# Patient Record
Sex: FEMALE | Race: AMERICAN INDIAN OR ALASKA NATIVE
[De-identification: names, ages, dates, MRNs, and addresses within clinical notes are randomized per-mention and may not be internally consistent; named-entity substitution may affect disease eponyms.]

---

## 2019-10-21 ENCOUNTER — HOSPITAL ENCOUNTER (EMERGENCY)
Dept: HOSPITAL 5 - ED | Age: 74
Discharge: HOME | End: 2019-10-21
Payer: MEDICARE

## 2019-10-21 VITALS — SYSTOLIC BLOOD PRESSURE: 141 MMHG | DIASTOLIC BLOOD PRESSURE: 82 MMHG

## 2019-10-21 DIAGNOSIS — M19.90: ICD-10-CM

## 2019-10-21 DIAGNOSIS — Z98.890: ICD-10-CM

## 2019-10-21 DIAGNOSIS — I11.0: ICD-10-CM

## 2019-10-21 DIAGNOSIS — I50.9: ICD-10-CM

## 2019-10-21 DIAGNOSIS — M25.552: ICD-10-CM

## 2019-10-21 DIAGNOSIS — E11.9: ICD-10-CM

## 2019-10-21 DIAGNOSIS — G89.29: ICD-10-CM

## 2019-10-21 DIAGNOSIS — M54.5: ICD-10-CM

## 2019-10-21 DIAGNOSIS — M41.9: Primary | ICD-10-CM

## 2019-10-21 PROCEDURE — 72100 X-RAY EXAM L-S SPINE 2/3 VWS: CPT

## 2019-10-21 NOTE — XRAY REPORT
LEFT HIP, 2 VIEWS



INDICATION:  left hip pain, chronic but worsening. 



COMPARISON: None.



IMPRESSION:  Osteopenia is evident.  Perhaps minimal osteoarthritic changes are identified. No eviden
ce for fracture, dislocation, bone lesion or osteonecrosis.



Signer Name: Anoop Persaud Jr, MD 

Signed: 10/21/2019 10:59 AM

 Workstation Name: [id number]

## 2019-10-21 NOTE — EMERGENCY DEPARTMENT REPORT
ED Extremity Problem HPI





- General


Chief complaint: Extremity Problem,Nontraumatic


Stated complaint: LFT SIDE HIP/BACK PAIN


Time Seen by Provider: 10/21/19 09:43


Source: patient


Mode of arrival: Wheelchair


Limitations: No Limitations





- History of Present Illness


Initial comments: 





74-year-old female with chronic lower back and left hip pain.  Moved here from 

[place] 2 months ago.  Prior to that was being seen by pain management 

receiving monthly hydrocodone/Tylenol 5/325 mg prescriptions.  Last dose of 

narcotics was in September when her last prescription ran out.  These of ongoing

pain without new injury and states that none of her previous treatments 

including narcotics, left hip, or lower back injections have ever helped her 

pain.  She denies urinary incontinence, focal weakness, numbness, or fever.  

Patient's new local PMD Dr. Jiménez informed her that he couldnt prescribe 

narcotics.





- Related Data


                                  Previous Rx's











 Medication  Instructions  Recorded  Last Taken  Type


 


HYDROcodone/APAP 5-325 [[place] 1 each PO Q6HR PRN #15 tablet 10/21/19 Unknown Rx





5/325]    











                                    Allergies











Allergy/AdvReac Type Severity Reaction Status Date / Time


 


No Known Allergies Allergy   Unverified 10/21/19 09:34














ED Review of Systems


ROS: 


Stated complaint: LFT SIDE HIP/BACK PAIN


Other details as noted in HPI





Comment: All other systems reviewed and negative





ED Past Medical Hx





- Past Medical History


Previous Medical History?: Yes


Hx Hypertension: Yes


Hx Congestive Heart Failure: Yes


Hx Diabetes: Yes


Hx Renal Disease: Yes


Additional medical history: Breast CA.  Scoliosis





- Surgical History


Past Surgical History?: Yes


Additional Surgical History: Kidney transplant.  lumpectomy





- Social History


Smoking Status: Never Smoker


Substance Use Type: None





- Medications


Home Medications: 


                                Home Medications











 Medication  Instructions  Recorded  Confirmed  Last Taken  Type


 


HYDROcodone/APAP 5-325 [[place] 1 each PO Q6HR PRN #15 tablet 10/21/19  Unknown Rx





5/325]     














ED Physical Exam





- General


Limitations: No Limitations





- Other


Other exam information: 





Gen.: No acute distress


Head: Atraumatic


Eyes: Normal appearance


ENT: Moist mucous membranes


Neck: Normal appearance, no posterior midline tenderness, no meningismus


Chest: Clear to auscultation bilaterally


Cardiovascular: Regular rate and rhythm


Abdomen: Normal appearance, soft, nontender, no rebound or guarding, normal 

bowel sounds


Back: Normal appearance, diffuse generalized back tenderness


Extremity: Pain with movement and left hip with mild tenderness to palpation.  

No deformity


Neuro: Alert, clear speech, no focal motor or sensory deficit


Psychiatric: Appropriate


Skin: No rash





ED Course


                                   Vital Signs











  10/21/19





  09:35


 


Temperature 98.9 F


 


Pulse Rate 81


 


Respiratory 15





Rate 


 


Blood Pressure 145/84


 


O2 Sat by Pulse 96





Oximetry 














ED Medical Decision Making





- Radiology Data


Radiology results: report reviewed








LEFT HIP, 2 VIEWS





INDICATION: left hip pain, chronic but worsening.





COMPARISON: None.





IMPRESSION: Osteopenia is evident. Perhaps minimal osteoarthritic changes are 

identified. No


evidence for fracture, dislocation, bone lesion or osteonecrosis.








L spine xray: see report, chronic findings. no acute fxt





- Medical Decision Making








Pt has chronic pain patient and as part [place] prescription monitoring site she

last filled 45 tablets of Lorcet 5/325 on August 29.  She was counseled on the 

importance outpatient follow-up with a pain management specialist lives.  No 

acute injury identified on today's evaluation





- Differential Diagnosis


chronic pain, pathologic fracture


Critical Care Time: No


Critical care attestation.: 


If time is entered above; I have spent that time in minutes in the direct care 

of this critically ill patient, excluding procedure time.








ED Disposition


Clinical Impression: 


 Acute exacerbation of chronic low back pain, Chronic left hip pain, Arthritis, 

Scoliosis





Disposition: DC-01 TO HOME OR SELFCARE


Is pt being admited?: No


Does the pt Need Aspirin: No


Condition: Stable


Instructions:  Chronic Back Pain (ED), Chronic Pain (ED), Osteoarthritis (ED)


Additional Instructions: 


Take the medication as prescribed.  Follow-up with your doctor or with the 

doctor/clinic provided.  Return if symptoms worsen as indicated by your 

discharge instructions.


Prescriptions: 


HYDROcodone/APAP 5-325 [[place] 5/325] 1 each PO Q6HR PRN #15 tablet


 PRN Reason: Pain


Referrals: 


JOHN JIMÉNEZ JR, MD [Referring] - 3-5 Days


ELISA MUNOZ MD [Staff Physician] - 3-5 Days


(orthopedic


)


Time of Disposition: 11:14

## 2019-10-21 NOTE — XRAY REPORT
LUMBOSACRAL SPINE, 3 VIEWS



INDICATION:  lower back pain, chronic but worsening.



COMPARISON: None.



IMPRESSION:  Osteopenia is evident.  There is moderate thoracolumbar scoliosis with levocurvature in 
the lumbar region.  There is 3 mm anterolisthesis of L4 with respect to L5 on the lateral view. Multi
level degenerative disc disease and facet arthropathy are identified which are most pronounced at T11
-12 and T12-L1. No evidence for compression deformity, displaced fracture or bone lesion. The sacrum 
and SI joints are unremarkable.  



Signer Name: Anoop Persaud Jr, MD 

Signed: 10/21/2019 11:01 AM

 Workstation Name: [id number]